# Patient Record
(demographics unavailable — no encounter records)

---

## 2017-01-01 NOTE — PHYS DOC
Past Medical History


Past Medical History:  No Pertinent History


Past Surgical History:  No Surgical History


Alcohol Use:  None


Drug Use:  None





Adult General


Chief Complaint


Chief Complaint:  SKIN PROBLEM





HPI


HPI


Patient is a 1Y 0M  year old female presents emergency room with complaint of a 

sore to the right side of patient's mouth that initially started as a cold 

sore. Mother reports that she is concerned that maybe affect is had some 

increased redness and drainage in the area. She denies patient having any 

fevers. She denies any history of recurrent skin infections. She denies 

antibiotic use in the past 30 days.





Review of Systems


Review of Systems





Constitutional: Denies fever or chills []


Eyes: Denies change in visual acuity, redness, or eye pain []


HENT: Denies nasal congestion or sore throat []


Respiratory: Denies cough or shortness of breath []


Cardiovascular: No additional information not addressed in HPI []


GI: Denies abdominal pain, nausea, vomiting, bloody stools or diarrhea []


: Denies dysuria or hematuria []


Musculoskeletal: Denies back pain or joint pain []


Integument: Denies rash or skin lesions []


Neurologic: Denies headache, focal weakness or sensory changes []


Endocrine: Denies polyuria or polydipsia []





Allergies


Allergies





 Allergies








Coded Allergies Type Severity Reaction Last Updated Verified


 


  No Known Drug Allergies    12/26/15 No











Physical Exam


Physical Exam





Constitutional: This is an alert, afebrile, well-developed, well-nourished, well

-hydrated, nontoxic appearing 1-year-old in no acute distress.


HENT: Normocephalic, atraumatic, bilateral external ears normal, oropharynx 

moist, no oral exudates, nose normal. 1 cm ulcerative lesion to the corner of 

patient's mouth on the right side. There is no active purulent drainage. There 

is no fluctuant pocket.


Eyes: PERRLA, EOMI, conjunctiva normal, no discharge. [] 


Neck: Normal range of motion, no tenderness, supple, no stridor. [] 


Cardiovascular:Heart rate regular rhythm, no murmur []


Lungs & Thorax:  Bilateral breath sounds clear to auscultation []


Abdomen: Bowel sounds normal, soft, no tenderness, no masses, no pulsatile 

masses. [] 


Skin: Warm, dry, no erythema, no rash. [] 


Back: No tenderness, no CVA tenderness. [] 


Extremities: No tenderness, no cyanosis, no clubbing, ROM intact, no edema. [] 


Neurologic: Alert and oriented X 3, normal motor function, normal sensory 

function, no focal deficits noted. []


Psychologic: Affect normal, judgement normal, mood normal. []





Current Patient Data


Vital Signs





 Vital Signs








  Date Time  Temp Pulse Resp B/P Pulse Ox O2 Delivery O2 Flow Rate FiO2


 


1/1/17 13:30 97.7  30  100   





 97.7       











EKG


EKG


[]





Radiology/Procedures


Radiology/Procedures


[]





Course & Med Decision Making


Course & Med Decision Making


Pertinent Labs and Imaging studies reviewed. (See chart for details)





[]





Dragon Disclaimer


Dragon Disclaimer


This electronic medical record was generated, in whole or in part, using a 

voice recognition dictation system.





Departure


Departure


Impression:  


 Primary Impression:  


 Cold sore


Disposition:  01 HOME, SELF-CARE


Condition:  GOOD


Referrals:  


ESCOBAR LANG (PCP)


Patient Instructions:  Her[facility]





Additional Instructions:


1. Apply the antibiotic ointment twice a day.


2. Ibuprofen every 8 hours for the discomfort.


3. Review the discharge instructions for reasons to the emergency room.


4. Call primary care doctor's office Monday morning to schedule follow-up 

appointment.


Scripts


Mupirocin (Mupirocin Ointment)22 Gm Oint...g.1 Melonie TP TID WOUND CARE #1 TUBE


   Prov:KAVON GILES         1/1/17








KAVON GILES Jan 1, 2017 14:30